# Patient Record
Sex: FEMALE | Race: WHITE | Employment: OTHER | RURAL
[De-identification: names, ages, dates, MRNs, and addresses within clinical notes are randomized per-mention and may not be internally consistent; named-entity substitution may affect disease eponyms.]

---

## 2017-01-11 ENCOUNTER — OFFICE VISIT (OUTPATIENT)
Dept: FAMILY MEDICINE CLINIC | Age: 57
End: 2017-01-11

## 2017-01-11 VITALS
WEIGHT: 174.6 LBS | DIASTOLIC BLOOD PRESSURE: 80 MMHG | RESPIRATION RATE: 20 BRPM | BODY MASS INDEX: 29.09 KG/M2 | OXYGEN SATURATION: 97 % | HEART RATE: 94 BPM | TEMPERATURE: 98.3 F | HEIGHT: 65 IN | SYSTOLIC BLOOD PRESSURE: 120 MMHG

## 2017-01-11 DIAGNOSIS — R07.9 CHEST PAIN, UNSPECIFIED TYPE: Primary | ICD-10-CM

## 2017-01-11 RX ORDER — ROSUVASTATIN CALCIUM 5 MG/1
5 TABLET, COATED ORAL
Qty: 90 TAB | Refills: 4
Start: 2017-01-11 | End: 2018-02-16

## 2017-01-11 NOTE — PROGRESS NOTES
Chief Complaint   Patient presents with    Hip Pain     both    Depression         HPI:       is a 61 y.o. female. 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 , she also takes care of her mother who lives in [place]. She has a history of anxiety and situational depression. Her , Ori Bonnerew, suffered a SDH evacuated at [facility] December 15, 2014. He continues to recover. Clayton Chapman has ADHD, GERD and Hyperlipidemia. She has been under a lot of stress. Marital, Financial, Legal stress. Notes periodic 1 minute episodes of SSCP that are random in frequency and not occurring daily--over the past month has had several.  She is a lifelong nonsmoker. Has stopped her Crestor. No prior cardiac history. She has a small Hiatal Hernia. At the worst it is a 5/10 and there is no nausea or diaphoresis. No Known Allergies    Current Outpatient Prescriptions   Medication Sig    losartan (COZAAR) 50 mg tablet Take 1 Tab by mouth daily.  amitriptyline (ELAVIL) 10 mg tablet Take 2 Tabs by mouth nightly.  valACYclovir (VALTREX) 500 mg tablet Take 1 Tab by mouth daily.  chlorthalidone (HYGROTEN) 25 mg tablet Take 1 Tab by mouth daily. Indications: Hypertension    codeine-butalbital-acetaminophen-caffeine (FIORICET WITH CODEINE) -04-30 mg per capsule Take 1 Cap by mouth every six (6) hours as needed for Headache. Max Daily Amount: 4 Caps. Indications: TENSION-TYPE HEADACHE    betamethasone valerate (VALISONE) 0.1 % topical lotion Apply  to affected area two (2) times a day.  LORazepam (ATIVAN) 1 mg tablet Take 1 Tab by mouth nightly. Max Daily Amount: 1 mg.  sertraline (ZOLOFT) 100 mg tablet Take 1 Tab by mouth daily. No current facility-administered medications for this visit.         Past Medical History   Diagnosis Date    ADHD (attention deficit hyperactivity disorder)     Anxiety     GERD (gastroesophageal reflux disease)     Hepatitis A     Hypercholesteremia     Pneumonia     Rheumatoid arthritis ([facility] [address] May 2016     CCP + 52    Shingles outbreak          ROS:  Denies fever, chills, cough, chest pain, SOB,  nausea, vomiting, or diarrhea. Denies wt loss, wt gain, hemoptysis, hematochezia or melena. Physical Examination:    Visit Vitals    /80 (BP 1 Location: Left arm, BP Patient Position: Sitting)    Pulse 94    Temp 98.3 °F (36.8 °C) (Temporal)    Resp 20    Ht 5' 5\" (1.651 m)    Wt 174 lb 9.6 oz (79.2 kg)    SpO2 97%    PF 97 L/min    BMI 29.05 kg/m2     General: Alert and Ox3, Fluent speech  HEENT:  NC/AT, EOMI, OP: clear  Neck:  Supple, no adenopathy, JVD, mass or bruit  Chest:  Clear to Ausculation, without wheezes, rales, rubs or ronchi  Cardiac: RRR  Abdomen:  +BS, soft, nontender without palpable HSM  Extremities:  No cyanosis, clubbing or edema  Neurologic:  Ambulatory without assist, CN 2-12 grossly intact. Moves all extremities. Skin: no rash  Lymphadenopathy: no cervical or supraclavicular nodes      ASSESSMENT AND PLAN:     1. Chest pain, etiology is unclear. Arranging EST and CXR. EKG in the office today. Suspect this may be a GI source, possibly the [facility]. Stress also a factor. Nonexertional.  No clear precipitant. I have asked her to resume her Crestor and to RTC in a month. Also consider an emperic trial of acid suppression therapy. Orders Placed This Encounter    XR CHEST PA LAT     Standing Status:   Future     Standing Expiration Date:   2/11/2018     Order Specific Question:   Reason for Exam     Answer:   chest pain     Order Specific Question:   Is Patient Allergic to Contrast Dye?      Answer:   Unknown    AMB POC EKG ROUTINE W/ 12 LEADS, INTER & REP     Order Specific Question:   Reason for Exam:     Answer:   Chest pain    STRESS TEST CARDIAC     Standing Status:   Future     Standing Expiration Date:   7/13/2017     Order Specific Question:   Reason for Exam:     Answer: Chest pain       Leo Mckenna MD, [address]

## 2017-01-11 NOTE — MR AVS SNAPSHOT
Visit Information Date & Time Provider Department Dept. Phone Encounter #  
 1/11/2017  9:00 AM Charo Nina, [address] [id number] Upcoming Health Maintenance Date Due Hepatitis C Screening 1960 DTaP/Tdap/Td series (1 - Tdap) 11/16/1980 PAP AKA CERVICAL CYTOLOGY 11/16/1980 BREAST CANCER SCRN MAMMOGRAM 11/16/2009 FOBT Q 1 YEAR AGE 50-75 11/16/2009 INFLUENZA AGE 9 TO ADULT 8/1/2016 Allergies as of 1/11/2017  Review Complete On: 1/11/2017 By: Charo Nina MD  
 No Known Allergies Current Immunizations  Never Reviewed Name Date Influenza Vaccine 1/20/2016  2:29 PM, 2/3/2014 Pneumococcal Conjugate (PCV-13) 5/25/2016 11:22 AM  
  
 Not reviewed this visit You Were Diagnosed With   
  
 Codes Comments Chest pain, unspecified type    -  Primary ICD-10-CM: R07.9 ICD-9-CM: 786.50 Vitals BP Pulse Temp Resp Height(growth percentile) Weight(growth percentile) 120/80 (BP 1 Location: Left arm, BP Patient Position: Sitting) 94 98.3 °F (36.8 °C) (Temporal) 20 5' 5\" (1.651 m) 174 lb 9.6 oz (79.2 kg) SpO2 PF BMI OB Status Smoking Status 97% 97 L/min 29.05 kg/m2 Postmenopausal Never Smoker BMI and BSA Data Body Mass Index Body Surface Area 29.05 kg/m 2 1.91 m 2 Preferred Pharmacy Pharmacy Name Phone [id number] [place] [address] [place] [address] [place] [phone] Your Updated Medication List  
  
   
This list is accurate as of: 1/11/17 10:00 AM.  Always use your most recent med list.  
  
  
  
  
 amitriptyline 10 mg tablet Commonly known as:  ELAVIL Take 2 Tabs by mouth nightly. betamethasone valerate 0.1 % topical lotion Commonly known as:  Kenia Rylee Apply  to affected area two (2) times a day. chlorthalidone 25 mg tablet Commonly known as:  Maddy Ashraf Take 1 Tab by mouth daily. Indications: Hypertension codeine-butalbital-acetaminophen-caffeine -94-30 mg per capsule Commonly known as:  FIORICET WITH CODEINE Take 1 Cap by mouth every six (6) hours as needed for Headache. Max Daily Amount: 4 Caps. Indications: TENSION-TYPE HEADACHE LORazepam 1 mg tablet Commonly known as:  ATIVAN Take 1 Tab by mouth nightly. Max Daily Amount: 1 mg.  
  
 losartan 50 mg tablet Commonly known as:  COZAAR Take 1 Tab by mouth daily. rosuvastatin 5 mg tablet Commonly known as:  CRESTOR Take 1 Tab by mouth nightly. sertraline 100 mg tablet Commonly known as:  ZOLOFT Take 1 Tab by mouth daily. valACYclovir 500 mg tablet Commonly known as:  VALTREX Take 1 Tab by mouth daily. We Performed the Following AMB POC EKG ROUTINE W/ 12 LEADS, INTER & REP [60838 CPT(R)] To-Do List   
 Around 01/25/2017 Imaging:  XR CHEST PA LAT   
  
 01/31/2017 ECG:  STRESS TEST CARDIAC Patient Instructions If you have any questions regarding [employer], you may call [employer] support at [phone]. Introducing \[facility]\"" & HEALTH SERVICES! [facility] introduces 51Talk patient portal. Now you can access parts of your medical record, email your doctor's office, and request medication refills online. 1. In your internet browser, go to [email or web address]://[email or web address]. [employer] 2. Click on the First Time User? Click Here link in the Sign In box. You will see the New Member Sign Up page. 3. Enter your 51Talk Access Code exactly as it appears below. You will not need to use this code after youve completed the sign-up process. If you do not sign up before the expiration date, you must request a new code. · 51Talk Access Code: [email or web address] Expires: 4/11/2017  9:12 AM 
 
4. Enter the last four digits of your Social Security Number (xxxx) and Date of Birth (mm/dd/yyyy) as indicated and click Submit. You will be taken to the next sign-up page. 5. Create a [employer] ID. This will be your [employer] login ID and cannot be changed, so think of one that is secure and easy to remember. 6. Create a [employer] password. You can change your password at any time. 7. Enter your Password Reset Question and Answer. This can be used at a later time if you forget your password. 8. Enter your e-mail address. You will receive e-mail notification when new information is available in [address] 9. Click Sign Up. You can now view and download portions of your medical record. 10. Click the Download Summary menu link to download a portable copy of your medical information. If you have questions, please visit the Frequently Asked Questions section of the [employer] website. Remember, [employer] is NOT to be used for urgent needs. For medical emergencies, dial 911. Now available from your iPhone and Android! Please provide this summary of care documentation to your next provider. Your primary care clinician is listed as Haroon Vargas. If you have any questions after today's visit, please call [phone].

## 2017-03-08 RX ORDER — SERTRALINE HYDROCHLORIDE 100 MG/1
100 TABLET, FILM COATED ORAL DAILY
Qty: 90 TAB | Refills: 4 | Status: SHIPPED | OUTPATIENT
Start: 2017-03-08 | End: 2017-11-22 | Stop reason: ALTCHOICE

## 2017-05-23 RX ORDER — LORAZEPAM 1 MG/1
1 TABLET ORAL
Qty: 90 TAB | Refills: 0 | Status: SHIPPED | OUTPATIENT
Start: 2017-05-23 | End: 2017-09-20 | Stop reason: SDUPTHER

## 2017-09-20 RX ORDER — LORAZEPAM 1 MG/1
1 TABLET ORAL
Qty: 90 TAB | Refills: 1 | Status: SHIPPED | OUTPATIENT
Start: 2017-09-20 | End: 2018-02-16 | Stop reason: SDUPTHER

## 2017-11-10 DIAGNOSIS — I10 ESSENTIAL HYPERTENSION: ICD-10-CM

## 2017-11-10 RX ORDER — LOSARTAN POTASSIUM 50 MG/1
50 TABLET ORAL DAILY
Qty: 90 TAB | Refills: 4 | Status: SHIPPED | OUTPATIENT
Start: 2017-11-10 | End: 2019-08-15 | Stop reason: ALTCHOICE

## 2017-11-22 ENCOUNTER — OFFICE VISIT (OUTPATIENT)
Dept: FAMILY MEDICINE CLINIC | Age: 57
End: 2017-11-22

## 2017-11-22 ENCOUNTER — TELEPHONE (OUTPATIENT)
Dept: FAMILY MEDICINE CLINIC | Age: 57
End: 2017-11-22

## 2017-11-22 VITALS
OXYGEN SATURATION: 97 % | DIASTOLIC BLOOD PRESSURE: 74 MMHG | HEIGHT: 65 IN | RESPIRATION RATE: 18 BRPM | HEART RATE: 96 BPM | TEMPERATURE: 99 F | BODY MASS INDEX: 28.62 KG/M2 | WEIGHT: 171.8 LBS | SYSTOLIC BLOOD PRESSURE: 112 MMHG

## 2017-11-22 DIAGNOSIS — J20.9 ACUTE BRONCHITIS, UNSPECIFIED ORGANISM: Primary | ICD-10-CM

## 2017-11-22 RX ORDER — METHYLPREDNISOLONE ACETATE 40 MG/ML
80 INJECTION, SUSPENSION INTRA-ARTICULAR; INTRALESIONAL; INTRAMUSCULAR; SOFT TISSUE ONCE
Qty: 2 VIAL
Start: 2017-11-22 | End: 2017-11-22

## 2017-11-22 RX ORDER — HYDROCODONE POLISTIREX AND CHLORPHENIRAMINE POLISTIREX 10; 8 MG/5ML; MG/5ML
5 SUSPENSION, EXTENDED RELEASE ORAL
Qty: 473 ML | Refills: 0 | Status: SHIPPED | OUTPATIENT
Start: 2017-11-22 | End: 2018-10-15 | Stop reason: ALTCHOICE

## 2017-11-22 RX ORDER — DULOXETIN HYDROCHLORIDE 60 MG/1
60 CAPSULE, DELAYED RELEASE ORAL DAILY
Qty: 90 CAP | Refills: 4
Start: 2017-11-22 | End: 2018-01-31 | Stop reason: SDUPTHER

## 2017-11-22 RX ORDER — AZITHROMYCIN 250 MG/1
TABLET, FILM COATED ORAL
Qty: 6 TAB | Refills: 0 | Status: SHIPPED | OUTPATIENT
Start: 2017-11-22 | End: 2017-11-27

## 2017-11-22 RX ORDER — CEFTRIAXONE 1 G/1
1 INJECTION, POWDER, FOR SOLUTION INTRAMUSCULAR; INTRAVENOUS ONCE
Qty: 1 VIAL | Refills: 0
Start: 2017-11-22 | End: 2017-11-22

## 2017-11-22 NOTE — MR AVS SNAPSHOT
Visit Information Date & Time Provider Department Dept. Phone Encounter #  
 11/22/2017 10:00 AM Khris Valenzuela MD [facility] [id number] Follow-up Instructions Return if symptoms worsen or fail to improve. Upcoming Health Maintenance Date Due Hepatitis C Screening 1960 DTaP/Tdap/Td series (1 - Tdap) 11/16/1980 PAP AKA CERVICAL CYTOLOGY 11/16/1980 BREAST CANCER SCRN MAMMOGRAM 11/16/2009 FOBT Q 1 YEAR AGE 50-75 11/16/2009 Allergies as of 11/22/2017  Review Complete On: 11/22/2017 By: Khris Valenzuela MD  
 No Known Allergies Current Immunizations  Never Reviewed Name Date Influenza Vaccine 1/20/2016  2:29 PM, 2/3/2014 Pneumococcal Conjugate (PCV-13) 5/25/2016 11:22 AM  
  
 Not reviewed this visit You Were Diagnosed With   
  
 Codes Comments Acute bronchitis, unspecified organism    -  Primary ICD-10-CM: J20.9 ICD-9-CM: 466.0 Vitals BP Pulse Temp Resp Height(growth percentile) Weight(growth percentile) 112/74 (BP 1 Location: Left arm, BP Patient Position: Sitting) 96 99 °F (37.2 °C) (Oral) 18 5' 5\" (1.651 m) 171 lb 12.8 oz (77.9 kg) SpO2 BMI OB Status Smoking Status 97% 28.59 kg/m2 Postmenopausal Never Smoker BMI and BSA Data Body Mass Index Body Surface Area 28.59 kg/m 2 1.89 m 2 Preferred Pharmacy Pharmacy Name Phone [id number] [place] [phone] Your Updated Medication List  
  
   
This list is accurate as of: 11/22/17 10:48 AM.  Always use your most recent med list.  
  
  
  
  
 amitriptyline 10 mg tablet Commonly known as:  ELAVIL TAKE 2 TABLETS BY MOUTH NIGHTLY  
  
 azithromycin 250 mg tablet Commonly known as:  Yovanny Nazario Take 2 tablets today, then take 1 tablet daily  
  
 betamethasone valerate 0.1 % topical lotion Commonly known as:  Eladio Lopez  
 Apply  to affected area two (2) times a day. cefTRIAXone 1 gram injection Commonly known as:  ROCEPHIN  
1 g by IntraMUSCular route once for 1 dose. chlorpheniramine-HYDROcodone 10-8 mg/5 mL suspension Commonly known as:  Justin Pereira Take 5 mL by mouth every twelve (12) hours as needed for Cough. Max Daily Amount: 10 mL. chlorthalidone 25 mg tablet Commonly known as:  Kathie Haiderh Take 1 Tab by mouth daily. Indications: Hypertension  
  
 codeine-butalbital-acetaminophen-caffeine -20-30 mg capsule Commonly known as:  FIORICET WITH CODEINE Take 1 Cap by mouth every six (6) hours as needed for Headache. Max Daily Amount: 4 Caps. Indications: TENSION-TYPE HEADACHE DULoxetine 60 mg capsule Commonly known as:  CYMBALTA Take 1 Cap by mouth daily. LORazepam 1 mg tablet Commonly known as:  ATIVAN Take 1 Tab by mouth nightly. Max Daily Amount: 1 mg.  
  
 losartan 50 mg tablet Commonly known as:  COZAAR Take 1 Tab by mouth daily. methylPREDNISolone acetate 40 mg/mL injection Commonly known as:  DEPO-Medrol 2 mL by IntraMUSCular route once for 1 dose. rosuvastatin 5 mg tablet Commonly known as:  CRESTOR Take 1 Tab by mouth nightly. valACYclovir 500 mg tablet Commonly known as:  VALTREX Take 1 Tab by mouth daily. Prescriptions Printed Refills  
 chlorpheniramine-HYDROcodone (TUSSIONEX) 10-8 mg/5 mL suspension 0 Sig: Take 5 mL by mouth every twelve (12) hours as needed for Cough. Max Daily Amount: 10 mL. Class: Print Route: Oral  
  
Prescriptions Sent to Pharmacy Refills  
 azithromycin (ZITHROMAX) 250 mg tablet 0 Sig: Take 2 tablets today, then take 1 tablet daily Class: Normal  
 Pharmacy: [facility]/pharmacy #[id number] Kimberly Husbands, [address] Ph #: [phone] We Performed the Following CEFTRIAXONE SODIUM INJECTION  MG [ [facility]] METHYLPREDNISOLONE ACETATE INJECTION 80 MG [ [facility]] NC THER/PROPH/DIAG INJECTION, SUBCUT/IM [id number] CPT(R)] THER/PROPH/DIAG INJECTION, SUBCUT/IM [id number] CPT(R)] Follow-up Instructions Return if symptoms worsen or fail to improve. To-Do List   
 11/30/2017 Imaging:  XR CHEST PA LAT Patient Instructions If you have any questions regarding [employer], you may call [employer] support at [phone]. Introducing [facility] HEALTH SERVICES! [facility] introduces S[employer] patient portal. Now you can access parts of your medical record, email your doctor's office, and request medication refills online. 1. In your internet browser, go to [email or web address] 2. Click on the First Time User? Click Here link in the Sign In box. You will see the New Member Sign Up page. 3. Enter your [employer] Access Code exactly as it appears below. You will not need to use this code after youve completed the sign-up process. If you do not sign up before the expiration date, you must request a new code. · [employer] Access Code: [email or web address] Expires: 2/20/2018 10:06 AM 
 
4. Enter the last four digits of your Social Security Number (xxxx) and Date of Birth (mm/dd/yyyy) as indicated and click Submit. You will be taken to the next sign-up page. 5. Create a [employer] ID. This will be your [employer] login ID and cannot be changed, so think of one that is secure and easy to remember. 6. Create a [employer] password. You can change your password at any time. 7. Enter your Password Reset Question and Answer. This can be used at a later time if you forget your password. 8. Enter your e-mail address. You will receive e-mail notification when new information is available in [address] 9. Click Sign Up. You can now view and download portions of your medical record. 10. Click the Download Summary menu link to download a portable copy of your medical information. If you have questions, please visit the Frequently Asked Questions section of the [employer] website. Remember, [employer] is NOT to be used for urgent needs. For medical emergencies, dial 911. Now available from your iPhone and Android! Please provide this summary of care documentation to your next provider. Your primary care clinician is listed as Damian Puri. If you have any questions after today's visit, please call [phone].

## 2017-11-22 NOTE — PROGRESS NOTES
Chief Complaint   Patient presents with    Cough         HPI:       is a 61 y.o. female. Progressively worsening cough and congestion > 10 days. Failing OTC remedies. No rigors. Denies vomiting or rash. No hemoptysis. Cough is at times productive. Depression:  No longer taking Sertraline. Instead, Duloxetine. No Known Allergies    Current Outpatient Prescriptions   Medication Sig    losartan (COZAAR) 50 mg tablet Take 1 Tab by mouth daily.  sertraline (ZOLOFT) 100 mg tablet Take 1 Tab by mouth daily.  valACYclovir (VALTREX) 500 mg tablet Take 1 Tab by mouth daily.  chlorthalidone (HYGROTEN) 25 mg tablet Take 1 Tab by mouth daily. Indications: Hypertension    codeine-butalbital-acetaminophen-caffeine (FIORICET WITH CODEINE) -17-30 mg per capsule Take 1 Cap by mouth every six (6) hours as needed for Headache. Max Daily Amount: 4 Caps. Indications: TENSION-TYPE HEADACHE    betamethasone valerate (VALISONE) 0.1 % topical lotion Apply  to affected area two (2) times a day.  LORazepam (ATIVAN) 1 mg tablet Take 1 Tab by mouth nightly. Max Daily Amount: 1 mg.  amitriptyline (ELAVIL) 10 mg tablet TAKE 2 TABLETS BY MOUTH NIGHTLY    rosuvastatin (CRESTOR) 5 mg tablet Take 1 Tab by mouth nightly. No current facility-administered medications for this visit. Past Medical History:   Diagnosis Date    ADHD (attention deficit hyperactivity disorder)     Anxiety     GERD (gastroesophageal reflux disease)     Hepatitis A     Hypercholesteremia     Pneumonia     Rheumatoid arthritis ([facility] [address] May 2016    CCP + 52    Shingles outbreak          ROS:  Denies fever,  nausea, vomiting, or diarrhea. Denies wt loss, wt gain, hemoptysis, hematochezia or melena.     Physical Examination:    /74 (BP 1 Location: Left arm, BP Patient Position: Sitting)  Pulse 96  Temp 99 °F (37.2 °C) (Oral)   Resp 18  Ht 5' 5\" (1.651 m)  Wt 171 lb 12.8 oz (77.9 kg)  SpO2 97%  BMI 28.59 kg/m2    General: Alert and Ox3, Fluent speech  HEENT:  NC/AT, EOMI, OP: clear  Neck:  Supple, no adenopathy, JVD, mass or bruit  Chest:  Clear to Ausculation, with bilateral lower loge ronchi, R>L  Cardiac: RRR  Abdomen:  +BS, soft, nontender without palpable HSM  Extremities:  No cyanosis, clubbing or edema  Neurologic:  Ambulatory without assist, CN 2-12 grossly intact. Moves all extremities. Skin: no rash  Lymphadenopathy: no cervical or supraclavicular nodes      ASSESSMENT AND PLAN:     1. Bronchitis: cannot entirely exclude early pneumonia:  Rocephin and Depomedrol now and then start azithromycin tomorrow. Tussionex susp for cough. Educated. The patient was advised to return to (or contact) the clinic or go to the ER for any ALARM sx such as temp > 101.5, worsening cough, sputum production, confusion or shortness of breath. No orders of the defined types were placed in this encounter.       Lily Vazquez MD, [address]

## 2017-11-22 NOTE — TELEPHONE ENCOUNTER
----- Message from [facility] sent at 11/22/2017  8:18 AM EST -----  Regarding: /Telephone  Pt is experience coughing with mucus, congestion,sore throat and  ear ache for 2 weeks. Best contact number is [phone].

## 2018-01-31 RX ORDER — DULOXETIN HYDROCHLORIDE 60 MG/1
60 CAPSULE, DELAYED RELEASE ORAL DAILY
Qty: 90 CAP | Refills: 4 | Status: SHIPPED | OUTPATIENT
Start: 2018-01-31 | End: 2019-02-18 | Stop reason: SDUPTHER

## 2018-02-07 ENCOUNTER — TELEPHONE (OUTPATIENT)
Dept: FAMILY MEDICINE CLINIC | Age: 58
End: 2018-02-07

## 2018-02-07 NOTE — TELEPHONE ENCOUNTER
Patient wants to see about getting wellness exam next week. Patient was advised about Dr. Jude Lindsay schedule here and in [place]. Would like to speak with Leila Garcia tomorrow to see if it is possible.

## 2018-02-16 ENCOUNTER — OFFICE VISIT (OUTPATIENT)
Dept: FAMILY MEDICINE CLINIC | Age: 58
End: 2018-02-16

## 2018-02-16 VITALS
DIASTOLIC BLOOD PRESSURE: 72 MMHG | HEART RATE: 92 BPM | BODY MASS INDEX: 28.99 KG/M2 | WEIGHT: 174 LBS | HEIGHT: 65 IN | RESPIRATION RATE: 16 BRPM | OXYGEN SATURATION: 95 % | SYSTOLIC BLOOD PRESSURE: 112 MMHG

## 2018-02-16 DIAGNOSIS — F51.01 PRIMARY INSOMNIA: ICD-10-CM

## 2018-02-16 DIAGNOSIS — M06.042 RHEUMATOID ARTHRITIS INVOLVING BOTH HANDS WITH NEGATIVE RHEUMATOID FACTOR (HCC): Primary | ICD-10-CM

## 2018-02-16 DIAGNOSIS — F41.9 ANXIETY: ICD-10-CM

## 2018-02-16 DIAGNOSIS — I10 ESSENTIAL HYPERTENSION: ICD-10-CM

## 2018-02-16 DIAGNOSIS — Z12.11 SCREENING FOR COLON CANCER: ICD-10-CM

## 2018-02-16 DIAGNOSIS — M06.041 RHEUMATOID ARTHRITIS INVOLVING BOTH HANDS WITH NEGATIVE RHEUMATOID FACTOR (HCC): Primary | ICD-10-CM

## 2018-02-16 RX ORDER — LORAZEPAM 1 MG/1
1 TABLET ORAL
Qty: 90 TAB | Refills: 1 | Status: SHIPPED | OUTPATIENT
Start: 2018-02-16 | End: 2018-10-14 | Stop reason: SDUPTHER

## 2018-02-16 NOTE — MR AVS SNAPSHOT
Surendra Colunga 
 
 
 [address] [address] [phone] Patient: Marquetta Kanner MRN: [id number] Visit Information Date & Time Provider Department Dept. Phone Encounter #  
 2/16/2018 10:15 AM Idalia Orozco MD [address] [place] [address] [id number] Follow-up Instructions Return in about 6 months (around 8/16/2018). Follow-up and Disposition History Upcoming Health Maintenance Date Due Hepatitis C Screening 1960 DTaP/Tdap/Td series (1 - Tdap) 11/16/1980 PAP AKA CERVICAL CYTOLOGY 11/16/1980 BREAST CANCER SCRN MAMMOGRAM 11/16/2009 FOBT Q 1 YEAR AGE 50-75 11/16/2009 Allergies as of 2/16/2018  Review Complete On: 2/16/2018 By: Idalia Orozco MD  
 No Known Allergies Current Immunizations  Never Reviewed Name Date Influenza Vaccine 1/20/2016  2:29 PM, 2/3/2014 Pneumococcal Conjugate (PCV-13) 5/25/2016 11:22 AM  
  
 Not reviewed this visit You Were Diagnosed With   
  
 Codes Comments Rheumatoid arthritis involving both hands with negative rheumatoid factor ([facility] [address]    -  Primary ICD-10-CM: M06.041, M32.555 ICD-9-CM: 714.0 Essential hypertension     ICD-10-CM: I10 
ICD-9-CM: 401.9 Anxiety     ICD-10-CM: F41.9 ICD-9-CM: 300.00 Primary insomnia     ICD-10-CM: F51.01 
ICD-9-CM: 307.42 Vitals BP Pulse Resp Height(growth percentile) Weight(growth percentile) SpO2  
 112/72 (BP 1 Location: Left arm, BP Patient Position: Sitting) 92 16 5' 5\" (1.651 m) 174 lb (78.9 kg) 95% BMI OB Status Smoking Status 28.96 kg/m2 Postmenopausal Never Smoker Vitals History BMI and BSA Data Body Mass Index Body Surface Area  
 28.96 kg/m 2 1.9 m 2 Preferred Pharmacy Pharmacy Name Phone [facility]ARMAC[facility] #[id number]- [place], [address] AT [place] [address] [phone] Your Updated Medication List  
  
   
 This list is accurate as of: 2/16/18 11:11 AM.  Always use your most recent med list.  
  
  
  
  
 amitriptyline 10 mg tablet Commonly known as:  ELAVIL TAKE 2 TABLETS BY MOUTH NIGHTLY  
  
 betamethasone valerate 0.1 % topical lotion Commonly known as:  Tanjapallavi Confer Apply  to affected area two (2) times a day. chlorpheniramine-HYDROcodone 10-8 mg/5 mL suspension Commonly known as:  Melia Cr Take 5 mL by mouth every twelve (12) hours as needed for Cough. Max Daily Amount: 10 mL. chlorthalidone 25 mg tablet Commonly known as:  HYGROTEN  
TAKE 1 TABLET BY MOUTH DAILY  
  
 codeine-butalbital-acetaminophen-caffeine -80-30 mg capsule Commonly known as:  FIORICET WITH CODEINE Take 1 Cap by mouth every six (6) hours as needed for Headache. Max Daily Amount: 4 Caps. Indications: TENSION-TYPE HEADACHE DULoxetine 60 mg capsule Commonly known as:  CYMBALTA Take 1 Cap by mouth daily. LORazepam 1 mg tablet Commonly known as:  ATIVAN Take 1 Tab by mouth nightly. Max Daily Amount: 1 mg.  
  
 losartan 50 mg tablet Commonly known as:  COZAAR Take 1 Tab by mouth daily. valACYclovir 500 mg tablet Commonly known as:  VALTREX  
TAKE 1 TABLET BY MOUTH DAILY. Prescriptions Printed Refills LORazepam (ATIVAN) 1 mg tablet 1 Sig: Take 1 Tab by mouth nightly. Max Daily Amount: 1 mg. Class: Print Route: Oral  
  
We Performed the Following CBC WITH AUTOMATED DIFF [31610 CPT(R)] LIPID PANEL [04831 CPT(R)] METABOLIC PANEL, COMPREHENSIVE [38223 CPT(R)] TSH 3RD GENERATION [53710 CPT(R)] Follow-up Instructions Return in about 6 months (around 8/16/2018). Patient Instructions If you have any questions regarding [employer], you may call [employer] support at [phone]. Introducing [facility] & HEALTH SERVICES!    
 [facility] introduces [employer] patient portal. Now you can access parts of your medical record, email your doctor's office, and request medication refills online. 1. In your internet browser, go to [email or web address] 2. Click on the First Time User? Click Here link in the Sign In box. You will see the New Member Sign Up page. 3. Enter your InMobi Access Code exactly as it appears below. You will not need to use this code after youve completed the sign-up process. If you do not sign up before the expiration date, you must request a new code. · InMobi Access Code: [email or web address] Expires: 2/20/2018 10:06 AM 
 
4. Enter the last four digits of your Social Security Number (xxxx) and Date of Birth (mm/dd/yyyy) as indicated and click Submit. You will be taken to the next sign-up page. 5. Create a InMobi ID. This will be your InMobi login ID and cannot be changed, so think of one that is secure and easy to remember. 6. Create a InMobi password. You can change your password at any time. 7. Enter your Password Reset Question and Answer. This can be used at a later time if you forget your password. 8. Enter your e-mail address. You will receive e-mail notification when new information is available in [address] 9. Click Sign Up. You can now view and download portions of your medical record. 10. Click the Download Summary menu link to download a portable copy of your medical information. If you have questions, please visit the Frequently Asked Questions section of the InMobi website. Remember, [employer] is NOT to be used for urgent needs. For medical emergencies, dial 911. Now available from your iPhone and Android! Please provide this summary of care documentation to your next provider. Your primary care clinician is listed as Isaiah Strauss. If you have any questions after today's visit, please call [phone].

## 2018-02-16 NOTE — PROGRESS NOTES
Chief Complaint   Patient presents with    Annual Exam    Medication Refill         HPI:       is a 61 y.o. female. 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 , she also takes care of her mother who lives in [place]. She has a history of anxiety and situational depression. Her , Lexi Klein, suffered a SDH evacuated at [facility] December 15, 2014. He continues to recover. Margaux Zamora has ADHD, GERD and Hyperlipidemia. She has been under a lot of stress. Marital, Financial, Legal stress. No Known Allergies    Current Outpatient Prescriptions   Medication Sig    LORazepam (ATIVAN) 1 mg tablet Take 1 Tab by mouth nightly. Max Daily Amount: 1 mg.  DULoxetine (CYMBALTA) 60 mg capsule Take 1 Cap by mouth daily.  valACYclovir (VALTREX) 500 mg tablet TAKE 1 TABLET BY MOUTH DAILY.  chlorthalidone (HYGROTEN) 25 mg tablet TAKE 1 TABLET BY MOUTH DAILY    losartan (COZAAR) 50 mg tablet Take 1 Tab by mouth daily.  codeine-butalbital-acetaminophen-caffeine (FIORICET WITH CODEINE) -84-30 mg per capsule Take 1 Cap by mouth every six (6) hours as needed for Headache. Max Daily Amount: 4 Caps. Indications: TENSION-TYPE HEADACHE    betamethasone valerate (VALISONE) 0.1 % topical lotion Apply  to affected area two (2) times a day.  chlorpheniramine-HYDROcodone (TUSSIONEX) 10-8 mg/5 mL suspension Take 5 mL by mouth every twelve (12) hours as needed for Cough. Max Daily Amount: 10 mL.  amitriptyline (ELAVIL) 10 mg tablet TAKE 2 TABLETS BY MOUTH NIGHTLY     No current facility-administered medications for this visit.         Past Medical History:   Diagnosis Date    ADHD (attention deficit hyperactivity disorder)     Anxiety     GERD (gastroesophageal reflux disease)     Hepatitis A     Hypercholesteremia     Pneumonia     Rheumatoid arthritis ([facility] [address] May 2016    CCP + 52    Shingles outbreak          ROS:  Denies fever, chills, cough, chest pain, SOB,  nausea, vomiting, or diarrhea. Denies wt loss, wt gain, hemoptysis, hematochezia or melena. Physical Examination:    /72 (BP 1 Location: Left arm, BP Patient Position: Sitting)  Pulse 92  Resp 16  Ht 5' 5\" (1.651 m)  Wt 174 lb (78.9 kg)  SpO2 95%  BMI 28.96 kg/m2    General: Alert and Ox3, Fluent speech  HEENT:  NC/AT, EOMI, OP: clear  Neck:  Supple, no adenopathy, JVD, mass or bruit  Chest:  Clear to Ausculation, without wheezes, rales, rubs or ronchi  Cardiac: RRR  Abdomen:  +BS, soft, nontender without palpable HSM  Extremities:  No cyanosis, clubbing or edema  Neurologic:  Ambulatory without assist, CN 2-12 grossly intact. Moves all extremities. Skin: no rash  Lymphadenopathy: no cervical or supraclavicular nodes      ASSESSMENT AND PLAN:     1.  RA: clinically well  2. HTN is well controlled  3. Hyperlipidemia: labs  4. Primary insomnia: Lorazepam refilled  5. BMI: walking and diet discussed    Orders Placed This Encounter    CBC WITH AUTOMATED DIFF    LIPID PANEL    METABOLIC PANEL, COMPREHENSIVE    TSH 3RD GENERATION    LORazepam (ATIVAN) 1 mg tablet     Sig: Take 1 Tab by mouth nightly. Max Daily Amount: 1 mg.      Dispense:  90 Tab     Refill:  1       Matilde Meza MD, [place]

## 2018-02-17 LAB
ALBUMIN SERPL-MCNC: 4.3 G/DL (ref 3.5–5.5)
ALBUMIN/GLOB SERPL: 1.9 {RATIO} (ref 1.2–2.2)
ALP SERPL-CCNC: 92 IU/L (ref 39–117)
ALT SERPL-CCNC: 16 IU/L (ref 0–32)
AST SERPL-CCNC: 16 IU/L (ref 0–40)
BASOPHILS # BLD AUTO: 0.1 X10E3/UL (ref 0–0.2)
BASOPHILS NFR BLD AUTO: 1 %
BILIRUB SERPL-MCNC: 0.3 MG/DL (ref 0–1.2)
BUN SERPL-MCNC: 16 MG/DL (ref 6–24)
BUN/CREAT SERPL: 25 (ref 9–23)
CALCIUM SERPL-MCNC: 9.3 MG/DL (ref 8.7–10.2)
CHLORIDE SERPL-SCNC: 95 MMOL/L (ref 96–106)
CHOLEST SERPL-MCNC: 212 MG/DL (ref 100–199)
CO2 SERPL-SCNC: 31 MMOL/L (ref 18–29)
CREAT SERPL-MCNC: 0.64 MG/DL (ref 0.57–1)
EOSINOPHIL # BLD AUTO: 0.1 X10E3/UL (ref 0–0.4)
EOSINOPHIL NFR BLD AUTO: 1 %
ERYTHROCYTE [DISTWIDTH] IN BLOOD BY AUTOMATED COUNT: 12.7 % (ref 12.3–15.4)
GFR SERPLBLD CREATININE-BSD FMLA CKD-EPI: 114 ML/MIN/1.73
GFR SERPLBLD CREATININE-BSD FMLA CKD-EPI: 99 ML/MIN/1.73
GLOBULIN SER CALC-MCNC: 2.3 G/DL (ref 1.5–4.5)
GLUCOSE SERPL-MCNC: 85 MG/DL (ref 65–99)
HCT VFR BLD AUTO: 37.8 % (ref 34–46.6)
HDLC SERPL-MCNC: 80 MG/DL
HGB BLD-MCNC: 12.6 G/DL (ref 11.1–15.9)
IMM GRANULOCYTES # BLD: 0 X10E3/UL (ref 0–0.1)
IMM GRANULOCYTES NFR BLD: 0 %
LDLC SERPL CALC-MCNC: 99 MG/DL (ref 0–99)
LYMPHOCYTES # BLD AUTO: 1.8 X10E3/UL (ref 0.7–3.1)
LYMPHOCYTES NFR BLD AUTO: 20 %
MCH RBC QN AUTO: 30.3 PG (ref 26.6–33)
MCHC RBC AUTO-ENTMCNC: 33.3 G/DL (ref 31.5–35.7)
MCV RBC AUTO: 91 FL (ref 79–97)
MONOCYTES # BLD AUTO: 0.7 X10E3/UL (ref 0.1–0.9)
MONOCYTES NFR BLD AUTO: 8 %
NEUTROPHILS # BLD AUTO: 6.2 X10E3/UL (ref 1.4–7)
NEUTROPHILS NFR BLD AUTO: 70 %
PLATELET # BLD AUTO: 407 X10E3/UL (ref 150–379)
POTASSIUM SERPL-SCNC: 4 MMOL/L (ref 3.5–5.2)
PROT SERPL-MCNC: 6.6 G/DL (ref 6–8.5)
RBC # BLD AUTO: 4.16 X10E6/UL (ref 3.77–5.28)
SODIUM SERPL-SCNC: 139 MMOL/L (ref 134–144)
TRIGL SERPL-MCNC: 166 MG/DL (ref 0–149)
TSH SERPL DL<=0.005 MIU/L-ACNC: 1.13 UIU/ML (ref 0.45–4.5)
VLDLC SERPL CALC-MCNC: 33 MG/DL (ref 5–40)
WBC # BLD AUTO: 8.9 X10E3/UL (ref 3.4–10.8)

## 2018-02-24 LAB — HEMOCCULT STL QL IA: NEGATIVE

## 2018-03-12 DIAGNOSIS — N30.00 ACUTE CYSTITIS WITHOUT HEMATURIA: ICD-10-CM

## 2018-03-12 RX ORDER — CIPROFLOXACIN 500 MG/1
500 TABLET ORAL 2 TIMES DAILY
Qty: 20 TAB | Refills: 5 | Status: SHIPPED | OUTPATIENT
Start: 2018-03-12 | End: 2018-03-22

## 2018-07-09 ENCOUNTER — TELEPHONE (OUTPATIENT)
Dept: FAMILY MEDICINE CLINIC | Age: 58
End: 2018-07-09

## 2018-07-13 ENCOUNTER — OFFICE VISIT (OUTPATIENT)
Dept: FAMILY MEDICINE CLINIC | Age: 58
End: 2018-07-13

## 2018-07-13 VITALS
HEIGHT: 65 IN | HEART RATE: 90 BPM | DIASTOLIC BLOOD PRESSURE: 76 MMHG | RESPIRATION RATE: 16 BRPM | BODY MASS INDEX: 27.82 KG/M2 | WEIGHT: 167 LBS | OXYGEN SATURATION: 96 % | SYSTOLIC BLOOD PRESSURE: 124 MMHG

## 2018-07-13 DIAGNOSIS — F41.9 ANXIETY: ICD-10-CM

## 2018-07-13 DIAGNOSIS — R42 DIZZY: ICD-10-CM

## 2018-07-13 DIAGNOSIS — Z12.31 ENCOUNTER FOR SCREENING MAMMOGRAM FOR BREAST CANCER: ICD-10-CM

## 2018-07-13 DIAGNOSIS — I10 ESSENTIAL HYPERTENSION: Primary | ICD-10-CM

## 2018-07-13 DIAGNOSIS — G44.229 CHRONIC TENSION-TYPE HEADACHE, NOT INTRACTABLE: ICD-10-CM

## 2018-07-13 RX ORDER — BUTALBITAL, ACETAMINOPHEN, CAFFEINE AND CODEINE PHOSPHATE 50; 325; 40; 30 MG/1; MG/1; MG/1; MG/1
1 CAPSULE ORAL
Qty: 20 CAP | Refills: 1 | Status: SHIPPED | OUTPATIENT
Start: 2018-07-13 | End: 2018-10-15 | Stop reason: ALTCHOICE

## 2018-07-13 NOTE — PROGRESS NOTES
Chief Complaint   Patient presents with    Headache         HPI:       is a 61 y.o. female who is under tremendous stress. Facing divorce and bankruptcy. Mother  4 months ago. Father recently diagnosed with metastatic prostate cancer. 6 days ago had an episode of dizziness while driving. Has not recurred. No Known Allergies    Current Outpatient Prescriptions   Medication Sig    codeine-butalbital-acetaminophen-caffeine (FIORICET WITH CODEINE) -80-30 mg capsule Take 1 Cap by mouth every six (6) hours as needed for Headache. Max Daily Amount: 4 Caps. Indications: TENSION-TYPE HEADACHE    DULoxetine (CYMBALTA) 60 mg capsule Take 1 Cap by mouth daily.  valACYclovir (VALTREX) 500 mg tablet TAKE 1 TABLET BY MOUTH DAILY.  chlorthalidone (HYGROTEN) 25 mg tablet TAKE 1 TABLET BY MOUTH DAILY    losartan (COZAAR) 50 mg tablet Take 1 Tab by mouth daily.  LORazepam (ATIVAN) 1 mg tablet Take 1 Tab by mouth nightly. Max Daily Amount: 1 mg.  chlorpheniramine-HYDROcodone (TUSSIONEX) 10-8 mg/5 mL suspension Take 5 mL by mouth every twelve (12) hours as needed for Cough. Max Daily Amount: 10 mL.  amitriptyline (ELAVIL) 10 mg tablet TAKE 2 TABLETS BY MOUTH NIGHTLY    betamethasone valerate (VALISONE) 0.1 % topical lotion Apply  to affected area two (2) times a day. No current facility-administered medications for this visit. Past Medical History:   Diagnosis Date    ADHD (attention deficit hyperactivity disorder)     Anxiety     GERD (gastroesophageal reflux disease)     Hepatitis A     Hypercholesteremia     Pneumonia     [facility]    CCP + 52    Shingles outbreak          ROS:  Denies fever, chills, cough, chest pain, SOB,  nausea, vomiting, or diarrhea. Denies wt loss, wt gain, hemoptysis, hematochezia or melena.     Physical Examination:    /76 (BP 1 Location: Left arm, BP Patient Position: Sitting)  Pulse 90  Resp 16  Ht 5' 5\" (1.651 m)  Wt 167 lb (75.8 kg)  SpO2 96%  BMI 27.79 kg/m2    General: Alert and Ox3, Fluent speech  HEENT:  NC/AT, EOMI, OP: clear  Neck:  Supple, no adenopathy, JVD, mass or bruit  Chest:  Clear to Ausculation, without wheezes, rales, rubs or ronchi  Cardiac: RRR  Abdomen:  +BS, soft, nontender without palpable HSM  Extremities:  No cyanosis, clubbing or edema  Neurologic:  Ambulatory without assist, CN 2-12 grossly intact. Moves all extremities. Skin: no rash  Lymphadenopathy: no cervical or supraclavicular nodes      ASSESSMENT AND PLAN:     1. Dizziness:  Suspect this is stress related. Will be checking labs today  And if sx recur, consider noncontrast head CT scan    Orders Placed This Encounter    OSVALDO 3D BEBA W MAMMO BI SCREENING INCL CAD     Standing Status:   Future     Standing Expiration Date:   8/10/2019     Scheduling Instructions:      [facility]     Order Specific Question:   Reason for Exam     Answer:   screen    CBC WITH AUTOMATED DIFF    METABOLIC PANEL, COMPREHENSIVE    TSH 3RD GENERATION    codeine-butalbital-acetaminophen-caffeine (FIORICET WITH CODEINE) -78-30 mg capsule     Sig: Take 1 Cap by mouth every six (6) hours as needed for Headache. Max Daily Amount: 4 Caps.  Indications: TENSION-TYPE HEADACHE     Dispense:  20 Cap     Refill:  1       Gabino Berman MD, [address]

## 2018-07-13 NOTE — MR AVS SNAPSHOT
[address] [facility] [phone] Patient: Manolo Henson MRN: [id number] ACI:09[birth year] Visit Information Date & Time Provider Department Dept. Phone Encounter #  
 7/13/2018  9:30 AM Benedict Blunt MD [address] [address] [id number] Upcoming Health Maintenance Date Due Hepatitis C Screening 1960 DTaP/Tdap/Td series (1 - Tdap) 11/16/1980 PAP AKA CERVICAL CYTOLOGY 11/16/1980 BREAST CANCER SCRN MAMMOGRAM 11/16/2009 Influenza Age 5 to Adult 8/1/2018 FOBT Q 1 YEAR AGE 50-75 2/20/2019 Allergies as of 7/13/2018  Review Complete On: 7/13/2018 By: Benedict Blunt MD  
 No Known Allergies Current Immunizations  Never Reviewed Name Date Influenza Vaccine 1/20/2016  2:29 PM, 2/3/2014 Pneumococcal Conjugate (PCV-13) 5/25/2016 11:22 AM  
  
 Not reviewed this visit You Were Diagnosed With   
  
 Codes Comments Essential hypertension    -  Primary ICD-10-CM: I10 
ICD-9-CM: 401.9 Anxiety     ICD-10-CM: F41.9 ICD-9-CM: 300.00 Dizzy     ICD-10-CM: R44 ICD-9-CM: 780. 4 Chronic tension-type headache, not intractable     ICD-10-CM: U32.955 ICD-9-CM: 339.12 Encounter for screening mammogram for breast cancer     ICD-10-CM: Z12.31 
ICD-9-CM: V76.12 Vitals BP Pulse Resp Height(growth percentile) Weight(growth percentile) SpO2  
 124/76 (BP 1 Location: Left arm, BP Patient Position: Sitting) 90 16 5' 5\" (1.651 m) 167 lb (75.8 kg) 96% BMI OB Status Smoking Status 27.79 kg/m2 Postmenopausal Never Smoker Vitals History BMI and BSA Data Body Mass Index Body Surface Area  
 27.79 kg/m 2 1.86 m 2 Preferred Pharmacy Pharmacy Name Phone [id number], [address] [place] [address] [address] [phone] Your Updated Medication List  
  
   
 This list is accurate as of 7/13/18 10:26 AM.  Always use your most recent med list.  
  
  
  
  
 amitriptyline 10 mg tablet Commonly known as:  ELAVIL TAKE 2 TABLETS BY MOUTH NIGHTLY  
  
 betamethasone valerate 0.1 % topical lotion Commonly known as:  Florhenrry Levywell Apply  to affected area two (2) times a day. chlorpheniramine-HYDROcodone 10-8 mg/5 mL suspension Commonly known as:  Ivory Cheeks Take 5 mL by mouth every twelve (12) hours as needed for Cough. Max Daily Amount: 10 mL. chlorthalidone 25 mg tablet Commonly known as:  HYGROTEN  
TAKE 1 TABLET BY MOUTH DAILY  
  
 codeine-butalbital-acetaminophen-caffeine -56-30 mg capsule Commonly known as:  FIORICET WITH CODEINE Take 1 Cap by mouth every six (6) hours as needed for Headache. Max Daily Amount: 4 Caps. Indications: TENSION-TYPE HEADACHE DULoxetine 60 mg capsule Commonly known as:  CYMBALTA Take 1 Cap by mouth daily. LORazepam 1 mg tablet Commonly known as:  ATIVAN Take 1 Tab by mouth nightly. Max Daily Amount: 1 mg.  
  
 losartan 50 mg tablet Commonly known as:  COZAAR Take 1 Tab by mouth daily. valACYclovir 500 mg tablet Commonly known as:  VALTREX  
TAKE 1 TABLET BY MOUTH DAILY. Prescriptions Printed Refills  
 codeine-butalbital-acetaminophen-caffeine (FIORICET WITH CODEINE) -80-30 mg capsule 1 Sig: Take 1 Cap by mouth every six (6) hours as needed for Headache. Max Daily Amount: 4 Caps. Indications: TENSION-TYPE HEADACHE Class: Print Route: Oral  
  
We Performed the Following CBC WITH AUTOMATED DIFF [24188 CPT(R)] METABOLIC PANEL, COMPREHENSIVE [81918 CPT(R)] TSH 3RD GENERATION [97650 CPT(R)] To-Do List   
 Around 08/12/2018 Imaging:  OSVALDO 3D BEBA W MAMMO BI SCREENING INCL CAD Patient Instructions If you have any questions regarding [employer], you may call [employer] support at [phone]. Introducing [facility]! [employer] introduces [employer] patient portal. Now you can access parts of your medical record, email your doctor's office, and request medication refills online. 1. In your internet browser, go to [email or web address] 2. Click on the First Time User? Click Here link in the Sign In box. You will see the New Member Sign Up page. 3. Enter your [employer] Access Code exactly as it appears below. You will not need to use this code after youve completed the sign-up process. If you do not sign up before the expiration date, you must request a new code. · [employer] Access Code: [email or web address] Expires: 10/11/2018 10:26 AM 
 
4. Enter the last four digits of your Social Security Number (xxxx) and Date of Birth (mm/dd/yyyy) as indicated and click Submit. You will be taken to the next sign-up page. 5. Create a [employer] ID. This will be your [employer] login ID and cannot be changed, so think of one that is secure and easy to remember. 6. Create a [employer] password. You can change your password at any time. 7. Enter your Password Reset Question and Answer. This can be used at a later time if you forget your password. 8. Enter your e-mail address. You will receive e-mail notification when new information is available in [address] 9. Click Sign Up. You can now view and download portions of your medical record. 10. Click the Download Summary menu link to download a portable copy of your medical information. If you have questions, please visit the Frequently Asked Questions section of the [employer] website. Remember, [employer] is NOT to be used for urgent needs. For medical emergencies, dial 911. Now available from your iPhone and Android! Please provide this summary of care documentation to your next provider. Your primary care clinician is listed as Cam Mckeon. If you have any questions after today's visit, please call [phone].

## 2018-07-14 LAB
ALBUMIN SERPL-MCNC: 4.7 G/DL (ref 3.5–5.5)
ALBUMIN/GLOB SERPL: 1.7 {RATIO} (ref 1.2–2.2)
ALP SERPL-CCNC: 121 IU/L (ref 39–117)
ALT SERPL-CCNC: 31 IU/L (ref 0–32)
AST SERPL-CCNC: 26 IU/L (ref 0–40)
BASOPHILS # BLD AUTO: 0.1 X10E3/UL (ref 0–0.2)
BASOPHILS NFR BLD AUTO: 1 %
BILIRUB SERPL-MCNC: 0.3 MG/DL (ref 0–1.2)
BUN SERPL-MCNC: 16 MG/DL (ref 6–24)
BUN/CREAT SERPL: 22 (ref 9–23)
CALCIUM SERPL-MCNC: 9.8 MG/DL (ref 8.7–10.2)
CHLORIDE SERPL-SCNC: 92 MMOL/L (ref 96–106)
CO2 SERPL-SCNC: 32 MMOL/L (ref 20–29)
CREAT SERPL-MCNC: 0.72 MG/DL (ref 0.57–1)
EOSINOPHIL # BLD AUTO: 0.1 X10E3/UL (ref 0–0.4)
EOSINOPHIL NFR BLD AUTO: 1 %
ERYTHROCYTE [DISTWIDTH] IN BLOOD BY AUTOMATED COUNT: 13.9 % (ref 12.3–15.4)
GLOBULIN SER CALC-MCNC: 2.7 G/DL (ref 1.5–4.5)
GLUCOSE SERPL-MCNC: 103 MG/DL (ref 65–99)
HCT VFR BLD AUTO: 42.8 % (ref 34–46.6)
HGB BLD-MCNC: 14 G/DL (ref 11.1–15.9)
IMM GRANULOCYTES # BLD: 0 X10E3/UL (ref 0–0.1)
IMM GRANULOCYTES NFR BLD: 0 %
LYMPHOCYTES # BLD AUTO: 2.1 X10E3/UL (ref 0.7–3.1)
LYMPHOCYTES NFR BLD AUTO: 23 %
MCH RBC QN AUTO: 31 PG (ref 26.6–33)
MCHC RBC AUTO-ENTMCNC: 32.7 G/DL (ref 31.5–35.7)
MCV RBC AUTO: 95 FL (ref 79–97)
MONOCYTES # BLD AUTO: 0.7 X10E3/UL (ref 0.1–0.9)
MONOCYTES NFR BLD AUTO: 8 %
NEUTROPHILS # BLD AUTO: 6.2 X10E3/UL (ref 1.4–7)
NEUTROPHILS NFR BLD AUTO: 67 %
PLATELET # BLD AUTO: 425 X10E3/UL (ref 150–379)
POTASSIUM SERPL-SCNC: 3.9 MMOL/L (ref 3.5–5.2)
PROT SERPL-MCNC: 7.4 G/DL (ref 6–8.5)
RBC # BLD AUTO: 4.52 X10E6/UL (ref 3.77–5.28)
SODIUM SERPL-SCNC: 138 MMOL/L (ref 134–144)
TSH SERPL DL<=0.005 MIU/L-ACNC: 1.44 UIU/ML (ref 0.45–4.5)
WBC # BLD AUTO: 9.3 X10E3/UL (ref 3.4–10.8)

## 2018-09-24 RX ORDER — AMITRIPTYLINE HYDROCHLORIDE 10 MG/1
TABLET, FILM COATED ORAL
Qty: 180 TAB | Refills: 1 | Status: SHIPPED | OUTPATIENT
Start: 2018-09-24 | End: 2019-06-18 | Stop reason: SDUPTHER

## 2021-06-28 ENCOUNTER — TELEPHONE (OUTPATIENT)
Dept: FAMILY MEDICINE CLINIC | Age: 61
End: 2021-06-28

## 2021-06-28 NOTE — TELEPHONE ENCOUNTER
Received request from [facility][id number] in [address] for refill of lorazepam 1mg. Patient will need appointment (must be in state) prior to any fills.  [employer] message sent to PT

## 2021-07-08 DIAGNOSIS — F41.9 ANXIETY: ICD-10-CM

## 2021-07-08 DIAGNOSIS — F51.01 PRIMARY INSOMNIA: ICD-10-CM

## 2021-07-10 DIAGNOSIS — F51.01 PRIMARY INSOMNIA: ICD-10-CM

## 2021-07-10 RX ORDER — LORAZEPAM 1 MG/1
1 TABLET ORAL
Qty: 90 TABLET | Refills: 0 | Status: SHIPPED | OUTPATIENT
Start: 2021-07-10 | End: 2021-09-17 | Stop reason: SDUPTHER

## 2021-09-12 NOTE — PROGRESS NOTES
Ms. Zunilda Beltran is a 63 y.o. female who is here for evaluation of   Chief Complaint   Patient presents with    Depression     C/O increased S/    Immunization/Injection     flu  provided    Medication Refill     Refill of ativan    Sleep Problem     C/O waking up around 3 AM nightly. No C/O falling asleep difficulty   . ASSESSMENT AND PLAN:    1. Reactive depression  Continue Duloxetine and Abilify    2. Anxiety  3. Primary insomnia  Lorazepam refilled  4. Essential hypertension  She is at goal  5. Rheumatoid arthritis involving both hands with negative rheumatoid factor (HCC)  stable      Orders Placed This Encounter    Influenza Virus Vaccine QUAD, PF Syr 6 Months + (Flulaval, Fluzone, Fluarix [id number])    CBC WITH AUTOMATED DIFF     Standing Status:   Future     Number of Occurrences:   1     Standing Expiration Date:   9/24/2021    LIPID PANEL     Standing Status:   Future     Number of Occurrences:   1     Standing Expiration Date:   5/44/9425    METABOLIC PANEL, COMPREHENSIVE     Standing Status:   Future     Number of Occurrences:   1     Standing Expiration Date:   9/24/2021    TSH 3RD GENERATION     Standing Status:   Future     Number of Occurrences:   1     Standing Expiration Date:   9/24/2021    valsartan (DIOVAN) 80 mg tablet     Sig: valsartan 80 mg tablet   Take 1 tablet every day by oral route. 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 She is treated for depression, anxiety and insomnia. Feels well except for the stress. ROS:  Denies  fever, chills, cough, chest pain, SOB,  nausea, vomiting, or diarrhea. Denies wt loss, wt gain, hemoptysis, hematochezia or melena.     Physical Examination:    Visit Vitals  /82 (BP 1 Location: Left upper arm, BP Patient Position: Sitting, BP Cuff Size: Adult long)   Pulse 75   Temp 97.8 °F (36.6 °C) (Temporal)   Resp 16   Ht 5' 5\" (1.651 m)   Wt 178 lb 6.4 oz (80.9 kg)   LMP  (LMP Unknown)   SpO2 98%   BMI 29.69 kg/m²      General:  Alert, cooperative, no distress. Head:  Normocephalic, without obvious abnormality, atraumatic. Eyes:  Conjunctivae/corneas clear. Pupils equal, round, reactive to light. Extraocular movements intact. Lungs:   Clear to auscultation bilaterally. Chest wall:  No tenderness or deformity. Cardiac:  RRR   Abdomen:   Soft, non-tender. Bowel sounds normal. No masses. No organomegaly. Extremities: Extremities normal, atraumatic, no cyanosis or edema. Pulses: 2+ and symmetric all extremities. Skin: Skin color, texture, turgor normal. No rashes or lesions. Lymph nodes: Cervical, supraclavicular, and axillary nodes normal.   Neurologic: CNII-XII intact. Normal strength, sensation, and reflexes throughout. On this date 09/17/2021 I have spent 30 minutes reviewing previous notes, test results and face to face with the patient discussing the diagnosis and importance of compliance with the treatment plan as well as documenting on the day of the visit.     Mckenzie Francis MD FACP    (signed electronically) on 9/17/2021 at 4:57 PM

## 2021-09-17 ENCOUNTER — OFFICE VISIT (OUTPATIENT)
Dept: FAMILY MEDICINE CLINIC | Age: 61
End: 2021-09-17
Payer: COMMERCIAL

## 2021-09-17 VITALS
WEIGHT: 178.4 LBS | TEMPERATURE: 97.8 F | SYSTOLIC BLOOD PRESSURE: 124 MMHG | HEIGHT: 65 IN | HEART RATE: 75 BPM | OXYGEN SATURATION: 98 % | BODY MASS INDEX: 29.72 KG/M2 | RESPIRATION RATE: 16 BRPM | DIASTOLIC BLOOD PRESSURE: 82 MMHG

## 2021-09-17 DIAGNOSIS — M06.041 RHEUMATOID ARTHRITIS INVOLVING BOTH HANDS WITH NEGATIVE RHEUMATOID FACTOR (HCC): ICD-10-CM

## 2021-09-17 DIAGNOSIS — F32.9 REACTIVE DEPRESSION: Primary | ICD-10-CM

## 2021-09-17 DIAGNOSIS — Z23 NEEDS FLU SHOT: ICD-10-CM

## 2021-09-17 DIAGNOSIS — I10 ESSENTIAL HYPERTENSION: ICD-10-CM

## 2021-09-17 DIAGNOSIS — F51.01 PRIMARY INSOMNIA: ICD-10-CM

## 2021-09-17 DIAGNOSIS — M06.042 RHEUMATOID ARTHRITIS INVOLVING BOTH HANDS WITH NEGATIVE RHEUMATOID FACTOR (HCC): ICD-10-CM

## 2021-09-17 DIAGNOSIS — F41.9 ANXIETY: ICD-10-CM

## 2021-09-17 PROCEDURE — 90471 IMMUNIZATION ADMIN: CPT | Performed by: INTERNAL MEDICINE

## 2021-09-17 PROCEDURE — 90686 IIV4 VACC NO PRSV 0.5 ML IM: CPT | Performed by: INTERNAL MEDICINE

## 2021-09-17 PROCEDURE — 99214 OFFICE O/P EST MOD 30 MIN: CPT | Performed by: INTERNAL MEDICINE

## 2021-09-17 RX ORDER — VALSARTAN 80 MG/1
TABLET ORAL
COMMUNITY
End: 2021-11-23

## 2021-09-17 RX ORDER — LORAZEPAM 1 MG/1
1 TABLET ORAL
Qty: 90 TABLET | Refills: 1 | Status: SHIPPED | OUTPATIENT
Start: 2021-09-17 | End: 2022-02-28 | Stop reason: SDUPTHER

## 2021-09-17 NOTE — PROGRESS NOTES
Miquel Monroe is a 63 y.o. female presenting for/with:    Chief Complaint   Patient presents with    Depression     C/O increased S/    Immunization/Injection     flu  provided    Medication Refill     Refill of ativan    Sleep Problem     C/O waking up around 3 AM nightly. No C/O falling asleep difficulty       Visit Vitals  /82 (BP 1 Location: Left upper arm, BP Patient Position: Sitting, BP Cuff Size: Adult long)   Pulse 75   Temp 97.8 °F (36.6 °C) (Temporal)   Resp 16   Ht 5' 5\" (1.651 m)   Wt 178 lb 6.4 oz (80.9 kg)   LMP  (LMP Unknown)   SpO2 98%   BMI 29.69 kg/m²     Pain Scale: 0 - No pain/10  Pain Location:     1. Have you been to the ER, urgent care clinic since your last visit? Hospitalized since your last visit? NO    2. Have you seen or consulted any other health care providers outside of the [address] [place] [address] since your last visit? Include any pap smears or colon screening.  NO    Symptom review:  NO  Fever   NO  Shaking chills  NO  Cough  NO  Body aches  NO  Coughing up blood  NO  Chest congestion  NO  Chest pain  NO  Shortness of breath  NO  Profound Loss of smell/taste  NO  Nausea/Vomiting   NO  Loose stool/Diarrhea  NO  any skin issues    Patient Risk Factors Reviewed as follows:  NO  have you been in Close contact with confirmed COVID19 patient   NO  History of recent travel to affected geographical areas within the past 14 days  NO  COPD  NO  Active Cancer/Leukemia/Lymphoma/Chemotherapy  NO  Oral steroid use  NO  Pregnant  NO  Diabetes Mellitus  YES  Heart disease  NO  Asthma  NO Health care worker at home  [address] care worker  NO Is there a Pregnant Woman in the home  NO Dialysis pt in the home   NO a large number of people living in the home    Learning Assessment 9/17/2021   PRIMARY LEARNER Patient   HIGHEST LEVEL OF EDUCATION - PRIMARY LEARNER  -   BARRIERS PRIMARY LEARNER -   PRIMARY LANGUAGE ENGLISH   LEARNER PREFERENCE PRIMARY READING   ANSWERED BY patient RELATIONSHIP SELF     Fall Risk Assessment, last 12 mths 9/17/2021   Able to walk? Yes   Fall in past 12 months? 0   Do you feel unsteady? 0   Are you worried about falling 0   Number of falls in past 12 months -   Fall with injury? -       3 most recent PHQ Screens 9/17/2021   Little interest or pleasure in doing things Nearly every day   Feeling down, depressed, irritable, or hopeless Nearly every day   Total Score PHQ 2 6   Trouble falling or staying asleep, or sleeping too much Nearly every day   Feeling tired or having little energy Nearly every day   Poor appetite, weight loss, or overeating Several days   Feeling bad about yourself - or that you are a failure or have let yourself or your family down Not at all   Trouble concentrating on things such as school, work, reading, or watching TV Nearly every day   Moving or speaking so slowly that other people could have noticed; or the opposite being so fidgety that others notice Not at all   Thoughts of being better off dead, or hurting yourself in some way -   PHQ 9 Score -   How difficult have these problems made it for you to do your work, take care of your home and get along with others Somewhat difficult     Abuse Screening Questionnaire 9/17/2021   Do you ever feel afraid of your partner? N   Are you in a relationship with someone who physically or mentally threatens you? N   Is it safe for you to go home?  Y       ADL Assessment 9/17/2021   Feeding yourself No Help Needed   Getting from bed to chair No Help Needed   Getting dressed No Help Needed   Bathing or showering No Help Needed   Walk across the room (includes cane/walker) No Help Needed   Using the telphone No Help Needed   Taking your medications No Help Needed   Preparing meals No Help Needed   Managing money (expenses/bills) No Help Needed   Moderately strenuous housework (laundry) No Help Needed   Shopping for personal items (toiletries/medicines) No Help Needed   Shopping for groceries No Help Needed   Driving No Help Needed   Climbing a flight of stairs No Help Needed   Getting to places beyond walking distances No Help Needed      No advance directive on file. Emergency contact on file.

## 2021-09-17 NOTE — PATIENT INSTRUCTIONS
Vaccine Information Statement    Influenza (Flu) Vaccine (Inactivated or Recombinant): What You Need to Know    Many vaccine information statements are available in Chinese and other languages. See www.immunize.org/vis. Hojas de información sobre vacunas están disponibles en español y en muchos otros idiomas. Visite www.immunize.org/vis. 1. Why get vaccinated? Influenza vaccine can prevent influenza (flu). Flu is a contagious disease that spreads around the [facility] every year, usually between October and May. Anyone can get the flu, but it is more dangerous for some people. Infants and young children, people 72 years and older, pregnant people, and people with certain health conditions or a weakened immune system are at greatest risk of flu complications. Pneumonia, bronchitis, sinus infections, and ear infections are examples of flu-related complications. If you have a medical condition, such as heart disease, cancer, or diabetes, flu can make it worse. Flu can cause fever and chills, sore throat, muscle aches, fatigue, cough, headache, and runny or stuffy nose. Some people may have vomiting and diarrhea, though this is more common in children than adults. In an average year, thousands of people in the [facility] die from flu, and many more are hospitalized. Flu vaccine prevents millions of illnesses and flu-related visits to the doctor each year. 2. Influenza vaccines     CDC recommends everyone 6 months and older get vaccinated every flu season. Children 6 months through 6years of age may need 2 doses during a single flu season. Everyone else needs only 1 dose each flu season. It takes about 2 weeks for protection to develop after vaccination. There are many flu viruses, and they are always changing. Each year a new flu vaccine is made to protect against the influenza viruses believed to be likely to cause disease in the upcoming flu season.  Even when the vaccine doesnt exactly match these viruses, it may still provide some protection. Influenza vaccine does not cause flu. Influenza vaccine may be given at the same time as other vaccines. 3. Talk with your health care provider    Tell your vaccination provider if the person getting the vaccine:   Has had an allergic reaction after a previous dose of influenza vaccine, or has any severe, life-threatening allergies    Has ever had Guillain-Barré Syndrome (also called GBS)    In some cases, your health care provider may decide to postpone influenza vaccination until a future visit. Influenza vaccine can be administered at any time during pregnancy. People who are or will be pregnant during influenza season should receive inactivated influenza vaccine. People with minor illnesses, such as a cold, may be vaccinated. People who are moderately or severely ill should usually wait until they recover before getting influenza vaccine. Your health care provider can give you more information. 4. Risks of a vaccine reaction     Soreness, redness, and swelling where the shot is given, fever, muscle aches, and headache can happen after influenza vaccination.  There may be a very small increased risk of Guillain-Barré Syndrome (GBS) after inactivated influenza vaccine (the flu shot). Zeke Coria children who get the flu shot along with pneumococcal vaccine (PCV13) and/or DTaP vaccine at the same time might be slightly more likely to have a seizure caused by fever. Tell your health care provider if a child who is getting flu vaccine has ever had a seizure. People sometimes faint after medical procedures, including vaccination. Tell your provider if you feel dizzy or have vision changes or ringing in the ears. As with any medicine, there is a very remote chance of a vaccine causing a severe allergic reaction, other serious injury, or death. 5. What if there is a serious problem?     An allergic reaction could occur after the vaccinated person leaves the clinic. If you see signs of a severe allergic reaction (hives, swelling of the face and throat, difficulty breathing, a fast heartbeat, dizziness, or weakness), call 9-1-1 and get the person to the nearest hospital.    For other signs that concern you, call your health care provider. Adverse reactions should be reported to the Vaccine Adverse Event Reporting System (VAERS). Your health care provider will usually file this report, or you can do it yourself. Visit the VAERS website at www.vaers. [facility].gov or call 0-439.414.1327. VAERS is only for reporting reactions, and VAERS staff members do not give medical advice. 6. The National Vaccine Injury Compensation Program    The [facility] Vaccine Injury Compensation Program (VICP) is a federal program that was created to compensate people who may have been injured by certain vaccines. Claims regarding alleged injury or death due to vaccination have a time limit for filing, which may be as short as two years. Visit the VICP website at www.[facility].gov/vaccinecompensation or call 7-988.387.9498 to learn about the program and about filing a claim. 7. How can I learn more?  Ask your health care provider.  Call your local or state health department.  Visit the website of the Food and Drug Administration (FDA) for vaccine package inserts and additional information at www.fda.gov/vaccines-blood-biologics/vaccines.  Contact the Centers for Disease Control and Prevention (CDC):  - Call 0-892.860.8504 (1-800-CDC-INFO) or  - Visit CDCs influenza website at www.cdc.gov/flu. Vaccine Information Statement   Inactivated Influenza Vaccine   8/6/2021  [address] [id number]   Department of Health and Human Services  Centers for Disease Control and Prevention    Office Use Only

## 2021-09-18 LAB
ALBUMIN SERPL-MCNC: 4.3 G/DL (ref 3.8–4.8)
ALBUMIN/GLOB SERPL: 1.7 {RATIO} (ref 1.2–2.2)
ALP SERPL-CCNC: 105 IU/L (ref 44–121)
ALT SERPL-CCNC: 24 IU/L (ref 0–32)
AST SERPL-CCNC: 18 IU/L (ref 0–40)
BASOPHILS # BLD AUTO: 0.1 X10E3/UL (ref 0–0.2)
BASOPHILS NFR BLD AUTO: 1 %
BILIRUB SERPL-MCNC: 0.2 MG/DL (ref 0–1.2)
BUN SERPL-MCNC: 11 MG/DL (ref 8–27)
BUN/CREAT SERPL: 14 (ref 12–28)
CALCIUM SERPL-MCNC: 9.4 MG/DL (ref 8.7–10.3)
CHLORIDE SERPL-SCNC: 94 MMOL/L (ref 96–106)
CHOLEST SERPL-MCNC: 237 MG/DL (ref 100–199)
CO2 SERPL-SCNC: 29 MMOL/L (ref 20–29)
CREAT SERPL-MCNC: 0.77 MG/DL (ref 0.57–1)
EOSINOPHIL # BLD AUTO: 0.2 X10E3/UL (ref 0–0.4)
EOSINOPHIL NFR BLD AUTO: 2 %
ERYTHROCYTE [DISTWIDTH] IN BLOOD BY AUTOMATED COUNT: 12.1 % (ref 11.7–15.4)
GLOBULIN SER CALC-MCNC: 2.5 G/DL (ref 1.5–4.5)
GLUCOSE SERPL-MCNC: 102 MG/DL (ref 65–99)
HCT VFR BLD AUTO: 40.7 % (ref 34–46.6)
HDLC SERPL-MCNC: 59 MG/DL
HGB BLD-MCNC: 14 G/DL (ref 11.1–15.9)
IMM GRANULOCYTES # BLD AUTO: 0 X10E3/UL (ref 0–0.1)
IMM GRANULOCYTES NFR BLD AUTO: 0 %
LDLC SERPL CALC-MCNC: 154 MG/DL (ref 0–99)
LYMPHOCYTES # BLD AUTO: 2.1 X10E3/UL (ref 0.7–3.1)
LYMPHOCYTES NFR BLD AUTO: 19 %
MCH RBC QN AUTO: 31 PG (ref 26.6–33)
MCHC RBC AUTO-ENTMCNC: 34.4 G/DL (ref 31.5–35.7)
MCV RBC AUTO: 90 FL (ref 79–97)
MONOCYTES # BLD AUTO: 0.8 X10E3/UL (ref 0.1–0.9)
MONOCYTES NFR BLD AUTO: 7 %
NEUTROPHILS # BLD AUTO: 8.1 X10E3/UL (ref 1.4–7)
NEUTROPHILS NFR BLD AUTO: 71 %
PLATELET # BLD AUTO: 355 X10E3/UL (ref 150–450)
POTASSIUM SERPL-SCNC: 3.4 MMOL/L (ref 3.5–5.2)
PROT SERPL-MCNC: 6.8 G/DL (ref 6–8.5)
RBC # BLD AUTO: 4.51 X10E6/UL (ref 3.77–5.28)
SODIUM SERPL-SCNC: 137 MMOL/L (ref 134–144)
TRIGL SERPL-MCNC: 136 MG/DL (ref 0–149)
TSH SERPL DL<=0.005 MIU/L-ACNC: 1.55 UIU/ML (ref 0.45–4.5)
VLDLC SERPL CALC-MCNC: 24 MG/DL (ref 5–40)
WBC # BLD AUTO: 11.4 X10E3/UL (ref 3.4–10.8)

## 2021-09-19 DIAGNOSIS — I10 ESSENTIAL HYPERTENSION: Primary | ICD-10-CM

## 2021-09-19 RX ORDER — POTASSIUM CHLORIDE 20 MEQ/1
20 TABLET, EXTENDED RELEASE ORAL DAILY
Qty: 90 TABLET | Refills: 4 | Status: SHIPPED | OUTPATIENT
Start: 2021-09-19 | End: 2021-09-24 | Stop reason: ALTCHOICE

## 2021-09-19 NOTE — PROGRESS NOTES
Patient notified by phone of lab results. Low K due to Chlorthalidone.   Will start Klor Con 20 one daily ([facility])  Monique Loja

## 2021-09-24 DIAGNOSIS — E87.6 HYPOKALEMIA: Primary | ICD-10-CM

## 2021-09-24 RX ORDER — POTASSIUM CHLORIDE 40 MEQ/15ML
1 SOLUTION ORAL DAILY
Qty: 480 ML | Refills: 3 | Status: SHIPPED | OUTPATIENT
Start: 2021-09-24 | End: 2021-10-04 | Stop reason: ALTCHOICE

## 2021-10-04 DIAGNOSIS — F32.9 REACTIVE DEPRESSION: Primary | ICD-10-CM

## 2021-10-04 RX ORDER — BUPROPION HYDROCHLORIDE 300 MG/1
300 TABLET ORAL DAILY
Qty: 90 TABLET | Refills: 4 | Status: SHIPPED | OUTPATIENT
Start: 2021-10-04 | End: 2022-02-28 | Stop reason: ALTCHOICE

## 2021-10-27 RX ORDER — VALACYCLOVIR HYDROCHLORIDE 500 MG/1
TABLET, FILM COATED ORAL
Qty: 30 TABLET | Refills: 3 | Status: SHIPPED | OUTPATIENT
Start: 2021-10-27 | End: 2022-02-23

## 2021-10-27 RX ORDER — CHLORTHALIDONE 25 MG/1
TABLET ORAL
Qty: 30 TABLET | Refills: 3 | Status: SHIPPED | OUTPATIENT
Start: 2021-10-27 | End: 2022-02-23

## 2021-11-23 RX ORDER — VALSARTAN 80 MG/1
TABLET ORAL
Qty: 30 TABLET | Refills: 4 | Status: SHIPPED | OUTPATIENT
Start: 2021-11-23 | End: 2022-02-28 | Stop reason: SDUPTHER

## 2022-02-23 RX ORDER — VALACYCLOVIR HYDROCHLORIDE 500 MG/1
TABLET, FILM COATED ORAL
Qty: 30 TABLET | Refills: 3 | Status: SHIPPED | OUTPATIENT
Start: 2022-02-23 | End: 2022-02-28 | Stop reason: SDUPTHER

## 2022-02-23 RX ORDER — CHLORTHALIDONE 25 MG/1
TABLET ORAL
Qty: 30 TABLET | Refills: 3 | Status: SHIPPED | OUTPATIENT
Start: 2022-02-23 | End: 2022-02-28 | Stop reason: SDUPTHER

## 2022-02-23 NOTE — PROGRESS NOTES
Ms. Kattie Kayser is a 57 y.o. female who is here for evaluation of   Chief Complaint   Patient presents with    Anxiety     moving to [place]    Medication Check   . ASSESSMENT AND PLAN:    1. Essential hypertension  Blood pressure is at goal.  Continue chlorthalidone in the morning and valsartan at bedtime  2. Primary insomnia  Gradually wean lorazepam at bedtime  3. Anxiety  Continue duloxetine and amitriptyline for now  4. Reactive depression  Wean off of Abilify  5. Encounter for screening for colorectal malignant neoplasm  We will need colorectal cancer screening but she will have to do this in [facility]. Orders Placed This Encounter    DULoxetine (CYMBALTA) 60 mg capsule     Sig: TAKE 1 CAPSULE BY MOUTH ONCE DAILY     Dispense:  90 Capsule     Refill:  3    valACYclovir (VALTREX) 500 mg tablet     Sig: Take 1 Tablet by mouth daily. Dispense:  90 Tablet     Refill:  3    chlorthalidone (HYGROTON) 25 mg tablet     Sig: Take 1 Tablet by mouth daily. Dispense:  90 Tablet     Refill:  3    valsartan (DIOVAN) 80 mg tablet     Sig: Take 1 Tablet by mouth nightly. Dispense:  90 Tablet     Refill:  4    LORazepam (ATIVAN) 1 mg tablet     Sig: Take 1 Tablet by mouth nightly as needed for Anxiety. Max Daily Amount: 1 mg. Dispense:  90 Tablet     Refill:  1     Not to exceed 5 additional fills before 08/15/2021.  ARIPiprazole (ABILIFY) 2 mg tablet     Sig: Take 1 Tablet by mouth daily. Dispense:  90 Tablet     Refill:  3    amitriptyline (ELAVIL) 10 mg tablet     Sig: Take 2 Tablets by mouth nightly as needed for Sleep. Dispense:  180 Tablet     Refill:  2           HPI 58-year-old who is under extreme amount of stress having recently declared bankruptcy and he was also caring for her  who is been quite ill-arrives for interval assessment of hypertension, anxiety, depression. Previously has been on valsartan and chlorthalidone for blood pressure control.     Over the past year has taken Wellbutrin, lorazepam, Abilify and amitriptyline. Denies suicidal thoughts or ideation. ROS:  Denies  fever, chills, cough, chest pain, SOB,  nausea, vomiting, or diarrhea. Denies wt loss, wt gain, hemoptysis, hematochezia or melena. Physical Examination:    Visit Vitals  /62   Pulse 89   Temp 97.5 °F (36.4 °C) (Temporal)   Resp 20   Ht 5' 5\" (1.651 m)   Wt 172 lb 9.6 oz (78.3 kg)   LMP  (LMP Unknown)   SpO2 97%   BMI 28.72 kg/m²      General:  Alert, cooperative, no distress. Head:  Normocephalic, without obvious abnormality, atraumatic. Eyes:  Conjunctivae/corneas clear. Pupils equal, round, reactive to light. Extraocular movements intact. Lungs:   Clear to auscultation bilaterally. Chest wall:  No tenderness or deformity. Cardiac:  RRR   Abdomen:   Soft, non-tender. Bowel sounds normal. No masses. No organomegaly. Extremities: Extremities normal, atraumatic, no cyanosis or edema. Pulses: 2+ and symmetric all extremities. Skin: Skin color, texture, turgor normal. No rashes or lesions. Lymph nodes: Cervical, supraclavicular, and axillary nodes normal.   Neurologic: CNII-XII intact. Normal strength, sensation, and reflexes throughout. On this date 02/28/2022 I have spent 30 minutes reviewing previous notes, test results and face to face with the patient discussing the diagnosis and importance of compliance with the treatment plan as well as documenting on the day of the visit.     Arsenio Leigh MD FACP    (signed electronically) on 2/28/2022 at 1:44 PM

## 2022-02-28 ENCOUNTER — OFFICE VISIT (OUTPATIENT)
Dept: FAMILY MEDICINE CLINIC | Age: 62
End: 2022-02-28
Payer: COMMERCIAL

## 2022-02-28 VITALS
RESPIRATION RATE: 20 BRPM | SYSTOLIC BLOOD PRESSURE: 118 MMHG | HEIGHT: 65 IN | TEMPERATURE: 97.5 F | DIASTOLIC BLOOD PRESSURE: 62 MMHG | HEART RATE: 89 BPM | WEIGHT: 172.6 LBS | BODY MASS INDEX: 28.76 KG/M2 | OXYGEN SATURATION: 97 %

## 2022-02-28 DIAGNOSIS — F32.9 REACTIVE DEPRESSION: ICD-10-CM

## 2022-02-28 DIAGNOSIS — F51.01 PRIMARY INSOMNIA: ICD-10-CM

## 2022-02-28 DIAGNOSIS — I10 ESSENTIAL HYPERTENSION: Primary | ICD-10-CM

## 2022-02-28 DIAGNOSIS — F41.9 ANXIETY: ICD-10-CM

## 2022-02-28 DIAGNOSIS — Z12.11 ENCOUNTER FOR SCREENING FOR COLORECTAL MALIGNANT NEOPLASM: ICD-10-CM

## 2022-02-28 DIAGNOSIS — Z12.12 ENCOUNTER FOR SCREENING FOR COLORECTAL MALIGNANT NEOPLASM: ICD-10-CM

## 2022-02-28 PROCEDURE — 99214 OFFICE O/P EST MOD 30 MIN: CPT | Performed by: INTERNAL MEDICINE

## 2022-02-28 RX ORDER — DULOXETIN HYDROCHLORIDE 60 MG/1
CAPSULE, DELAYED RELEASE ORAL
Qty: 90 CAPSULE | Refills: 3 | Status: SHIPPED | OUTPATIENT
Start: 2022-02-28

## 2022-02-28 RX ORDER — VALSARTAN 80 MG/1
80 TABLET ORAL
Qty: 90 TABLET | Refills: 4 | Status: SHIPPED | OUTPATIENT
Start: 2022-02-28

## 2022-02-28 RX ORDER — VALACYCLOVIR HYDROCHLORIDE 500 MG/1
500 TABLET, FILM COATED ORAL DAILY
Qty: 90 TABLET | Refills: 3 | Status: SHIPPED | OUTPATIENT
Start: 2022-02-28

## 2022-02-28 RX ORDER — CHLORTHALIDONE 25 MG/1
25 TABLET ORAL DAILY
Qty: 90 TABLET | Refills: 3 | Status: SHIPPED | OUTPATIENT
Start: 2022-02-28

## 2022-02-28 RX ORDER — AMITRIPTYLINE HYDROCHLORIDE 10 MG/1
20 TABLET, FILM COATED ORAL
Qty: 180 TABLET | Refills: 2 | Status: SHIPPED | OUTPATIENT
Start: 2022-02-28

## 2022-02-28 RX ORDER — LORAZEPAM 1 MG/1
1 TABLET ORAL
Qty: 90 TABLET | Refills: 1 | Status: SHIPPED | OUTPATIENT
Start: 2022-02-28

## 2022-02-28 RX ORDER — ARIPIPRAZOLE 2 MG/1
2 TABLET ORAL DAILY
Qty: 90 TABLET | Refills: 3 | Status: SHIPPED | OUTPATIENT
Start: 2022-02-28

## 2022-02-28 NOTE — PROGRESS NOTES
Keri Hernandez is a 57 y.o. female presenting for/with:    Chief Complaint   Patient presents with    Anxiety     moving to [place]    Medication Check       Visit Vitals  /62   Pulse 89   Temp 97.5 °F (36.4 °C) (Temporal)   Resp 20   Ht 5' 5\" (1.651 m)   Wt 172 lb 9.6 oz (78.3 kg)   LMP  (LMP Unknown)   SpO2 97%   BMI 28.72 kg/m²     Pain Scale: 0 - No pain/10  Pain Location:     1. Have you been to the ER, urgent care clinic since your last visit? Hospitalized since your last visit? NO    2. Have you seen or consulted any other health care providers outside of the [address] [place] [address] since your last visit? Include any pap smears or colon screening. NO    Symptom review:  NO  Fever   NO  Shaking chills  NO  Cough  NO  Body aches  NO  Coughing up blood  NO  Chest congestion  NO  Chest pain  NO  Shortness of breath  NO  Profound Loss of smell/taste  NO  Nausea/Vomiting   NO  Loose stool/Diarrhea  NO  any skin issues    Patient Risk Factors Reviewed as follows:  NO  have you been in Close contact with confirmed COVID19 patient   NO  History of recent travel to affected geographical areas within the past 14 days  NO  COPD  NO  Active Cancer/Leukemia/Lymphoma/Chemotherapy  NO  Oral steroid use  NO  Pregnant  NO  Diabetes Mellitus  NO  Heart disease  NO  Asthma  NO Health care worker at home  NO Health care worker  NO Is there a Pregnant Woman in the home  NO Dialysis pt in the home   NO a large number of people living in the home    Learning Assessment 9/17/2021   PRIMARY LEARNER Patient   HIGHEST LEVEL OF EDUCATION - PRIMARY LEARNER  -   BARRIERS PRIMARY LEARNER -   PRIMARY LANGUAGE ENGLISH   LEARNER PREFERENCE PRIMARY READING   ANSWERED BY patient   RELATIONSHIP SELF     Fall Risk Assessment, last 12 mths 2/28/2022   Able to walk? Yes   Fall in past 12 months? 0   Do you feel unsteady? 0   Are you worried about falling 0   Number of falls in past 12 months -   Fall with injury?  -       3 most recent PHQ Screens 2/28/2022   Little interest or pleasure in doing things Several days   Feeling down, depressed, irritable, or hopeless Several days   Total Score PHQ 2 2   Trouble falling or staying asleep, or sleeping too much -   Feeling tired or having little energy -   Poor appetite, weight loss, or overeating -   Feeling bad about yourself - or that you are a failure or have let yourself or your family down -   Trouble concentrating on things such as school, work, reading, or watching TV -   Moving or speaking so slowly that other people could have noticed; or the opposite being so fidgety that others notice -   Thoughts of being better off dead, or hurting yourself in some way -   PHQ 9 Score -   How difficult have these problems made it for you to do your work, take care of your home and get along with others -     Abuse Screening Questionnaire 2/28/2022   Do you ever feel afraid of your partner? N   Are you in a relationship with someone who physically or mentally threatens you? N   Is it safe for you to go home? Y       ADL Assessment 2/28/2022   Feeding yourself No Help Needed   Getting from bed to chair No Help Needed   Getting dressed No Help Needed   Bathing or showering No Help Needed   Walk across the room (includes cane/walker) No Help Needed   Using the telphone No Help Needed   Taking your medications No Help Needed   Preparing meals No Help Needed   Managing money (expenses/bills) No Help Needed   Moderately strenuous housework (laundry) No Help Needed   Shopping for personal items (toiletries/medicines) No Help Needed   Shopping for groceries No Help Needed   Driving No Help Needed   Climbing a flight of stairs No Help Needed   Getting to places beyond walking distances No Help Needed      No advanced directives on file. Verified emergency contact.

## 2023-03-11 RX ORDER — CHLORTHALIDONE 25 MG/1
TABLET ORAL
Qty: 90 TABLET | Refills: 3 | Status: SHIPPED | OUTPATIENT
Start: 2023-03-11

## 2023-04-24 RX ORDER — AMITRIPTYLINE HYDROCHLORIDE 10 MG/1
TABLET, FILM COATED ORAL
Qty: 180 TABLET | Refills: 1 | Status: SHIPPED | OUTPATIENT
Start: 2023-04-24

## 2023-05-11 RX ORDER — VALSARTAN 80 MG/1
TABLET ORAL
Qty: 30 TABLET | Refills: 0 | Status: SHIPPED | OUTPATIENT
Start: 2023-05-11

## 2023-05-20 RX ORDER — AMITRIPTYLINE HYDROCHLORIDE 10 MG/1
TABLET, FILM COATED ORAL
COMMUNITY
Start: 2023-04-24

## 2023-05-20 RX ORDER — CHLORTHALIDONE 25 MG/1
1 TABLET ORAL DAILY
COMMUNITY
Start: 2023-03-11

## 2023-05-20 RX ORDER — ARIPIPRAZOLE 2 MG/1
2 TABLET ORAL DAILY
COMMUNITY
Start: 2022-02-28

## 2023-05-20 RX ORDER — VALACYCLOVIR HYDROCHLORIDE 500 MG/1
1 TABLET, FILM COATED ORAL DAILY
COMMUNITY
Start: 2023-03-27

## 2023-05-20 RX ORDER — LORAZEPAM 1 MG/1
1 TABLET ORAL
COMMUNITY
Start: 2022-02-28

## 2023-05-20 RX ORDER — DULOXETIN HYDROCHLORIDE 60 MG/1
1 CAPSULE, DELAYED RELEASE ORAL DAILY
COMMUNITY
Start: 2022-02-28

## 2023-06-12 RX ORDER — VALSARTAN 80 MG/1
TABLET ORAL
Qty: 90 TABLET | Refills: 5 | Status: SHIPPED | OUTPATIENT
Start: 2023-06-12

## 2023-08-15 RX ORDER — VALACYCLOVIR HYDROCHLORIDE 500 MG/1
TABLET, FILM COATED ORAL
Qty: 30 TABLET | Refills: 5 | Status: SHIPPED | OUTPATIENT
Start: 2023-08-15

## 2024-03-04 RX ORDER — CHLORTHALIDONE 25 MG/1
25 TABLET ORAL DAILY
Qty: 30 TABLET | Refills: 4 | Status: SHIPPED | OUTPATIENT
Start: 2024-03-04

## 2024-03-24 RX ORDER — VALACYCLOVIR HYDROCHLORIDE 500 MG/1
TABLET, FILM COATED ORAL
Qty: 30 TABLET | Refills: 5 | Status: SHIPPED | OUTPATIENT
Start: 2024-03-24

## 2024-10-24 RX ORDER — VALACYCLOVIR HYDROCHLORIDE 500 MG/1
TABLET, FILM COATED ORAL
Qty: 30 TABLET | Refills: 5 | OUTPATIENT
Start: 2024-10-24